# Patient Record
Sex: MALE | Race: WHITE | URBAN - METROPOLITAN AREA
[De-identification: names, ages, dates, MRNs, and addresses within clinical notes are randomized per-mention and may not be internally consistent; named-entity substitution may affect disease eponyms.]

---

## 2022-03-02 ENCOUNTER — OFFICE VISIT (OUTPATIENT)
Dept: ENT CLINIC | Age: 27
End: 2022-03-02
Payer: COMMERCIAL

## 2022-03-02 VITALS
WEIGHT: 206 LBS | OXYGEN SATURATION: 98 % | BODY MASS INDEX: 34.32 KG/M2 | HEIGHT: 65 IN | DIASTOLIC BLOOD PRESSURE: 82 MMHG | RESPIRATION RATE: 16 BRPM | SYSTOLIC BLOOD PRESSURE: 126 MMHG | HEART RATE: 83 BPM | TEMPERATURE: 97.5 F

## 2022-03-02 DIAGNOSIS — J34.89 NASAL DRYNESS: ICD-10-CM

## 2022-03-02 DIAGNOSIS — R06.5 MOUTH BREATHING: Primary | ICD-10-CM

## 2022-03-02 DIAGNOSIS — R06.83 SNORING: ICD-10-CM

## 2022-03-02 DIAGNOSIS — R09.82 PND (POST-NASAL DRIP): ICD-10-CM

## 2022-03-02 DIAGNOSIS — R09.81 NASAL CONGESTION: ICD-10-CM

## 2022-03-02 PROCEDURE — 99204 OFFICE O/P NEW MOD 45 MIN: CPT | Performed by: NURSE PRACTITIONER

## 2022-03-02 RX ORDER — AZELASTINE 1 MG/ML
1 SPRAY, METERED NASAL 2 TIMES DAILY
Qty: 30 EACH | Refills: 3 | Status: SHIPPED | OUTPATIENT
Start: 2022-03-02 | End: 2022-07-25 | Stop reason: ALTCHOICE

## 2022-03-02 RX ORDER — MONTELUKAST SODIUM 10 MG/1
10 TABLET ORAL DAILY
Qty: 30 TABLET | Refills: 3 | Status: SHIPPED | OUTPATIENT
Start: 2022-03-02 | End: 2022-07-25 | Stop reason: ALTCHOICE

## 2022-03-02 NOTE — PROGRESS NOTES
Visit Vitals  /82 (BP 1 Location: Left upper arm, BP Patient Position: Sitting, BP Cuff Size: Large adult)   Pulse 83   Temp 97.5 °F (36.4 °C) (Temporal)   Resp 16   Ht 5' 5\" (1.651 m)   Wt 206 lb (93.4 kg)   SpO2 98%   BMI 34.28 kg/m²     Chief Complaint   Patient presents with    New Patient     sinus

## 2022-03-02 NOTE — PROGRESS NOTES
Otolaryngology-Head and Neck Surgery  New Patient Visit     Patient: Yasmin Agrawal  YOB: 1995  MRN: [id number]  Date of Service: 3/2/2022    Chief Complaint: nasal congestion    History of Present Illness: Yasmin Agrawal is a 23y.o. year old male who presents today for discussion of      Reports congestion for past 2 years  +PND, intermittent loss of smell, sinus pressure, snoring, mouth breathing with periods of apnea  Denies otalgia, otorrhea, rhinorrhea  Alternates sides  Worse at night, awakens unable to breath several times at night  Also states a correlation with riding with employee who smokes. Congestion increases around smoke. He works for a [employer]. No alleviating factors    Tried Flonase for 1 week caused sore throat and difficulty swallowing  antihistamines prn    Never had allergy testing  No ENT surgical HX  No pertinent family HX    Past Medical History:  History reviewed. No pertinent past medical history. Past Surgical History:   History reviewed. No pertinent surgical history. Medications:   No current outpatient medications    Allergies:   No Known Allergies    Social History:   Social History     Tobacco Use    Smoking status: Never Smoker    Smokeless tobacco: Never Used   Substance Use Topics    Alcohol use: Not on file    Drug use: Not on file        Family History:  History reviewed. No pertinent family history. Review of Systems:    Consitutional: denies fever, excessive weight gain or loss. Eyes: denies diplopia, eye pain. Integumentary: denies new concerning skin lesions. Ears, Nose, Mouth, Throat: denies except as per HPI.   Endocrine: denies hot or cold intolerance, increased thirst.  Respiratory: denies cough, hemoptysis, wheezing  Gastrointestinal: denies trouble swallowing, nausea, emesis, regurgitation  Musculoskeletal: denies muscle weakness or wasting  Cardiovascular: denies chest pain, shortness of breath  Neurologic: denies seizures, numbness or tingling, syncope  Hematologic: denies easy bleeding or bruising    Physical Examination:   Vitals:    03/02/22 0859   BP: 126/82   BP 1 Location: Left upper arm   BP Patient Position: Sitting   BP Cuff Size: Large adult   Pulse: 83   Temp: 97.5 °F (36.4 °C)   TempSrc: Temporal   Resp: 16   Height: 5' 5\" (1.651 m)   Weight: 206 lb (93.4 kg)   SpO2: 98%        General: Comfortable, pleasant, appears stated age  Voice: Strong, speaking in full sentences, no stridor    Face: No masses or lesions, facial strength symmetric   Ears: External ears unremarkable. Bilateral ear canal clear. Tympanic membrane clear and intact, with visible landmarks. Clear middle ear space  Nose: External nose unremarkable. Dorsum midline. Anterior rhinoscopy demonstrates dry nasal passage, no lesions. Septum midline. Turbinates edematous. Oral Cavity / Oropharynx: No trismus. Mucosa pink and moist. No lesions. Tongue is midline and mobile. Palate elevates symmetrically. Uvula midline. Tonsils 1-2+ with notable signs of sinus drainage. Base of tongue soft. Floor of mouth soft. Neck: Supple. No adenopathy. Thyroid unremarkable. Palpable laryngeal landmarks. Full neck range of motion   Neurologic: CN II - XI intact. Normal gait      Assessment and Plan:   1. Nasal congestion    2. PND   3. Snoring  4. Mouth breathing  5. Nasal dryness    -Will trial Astelin and Singulair. Suspect some allergic components.  -Patient reports symptoms prominent at night and has recently been told has periods of apnea. Tonsils were mildly enlarged on exam. Will refer to sleep medicine.   -Nasal saline rinses  -Humidifier, steam  -Return to office in 6 weeks.        Windy Cedillo MSN, FNP-C  [facility]  [address] [place] [address] 6  [facility]  Office Phone: [phone]

## 2022-04-05 ENCOUNTER — OFFICE VISIT (OUTPATIENT)
Dept: SLEEP MEDICINE | Age: 27
End: 2022-04-05
Payer: COMMERCIAL

## 2022-04-05 VITALS
DIASTOLIC BLOOD PRESSURE: 70 MMHG | HEART RATE: 76 BPM | WEIGHT: 210 LBS | HEIGHT: 65 IN | SYSTOLIC BLOOD PRESSURE: 111 MMHG | OXYGEN SATURATION: 99 % | BODY MASS INDEX: 34.99 KG/M2

## 2022-04-05 DIAGNOSIS — G47.33 OSA (OBSTRUCTIVE SLEEP APNEA): Primary | ICD-10-CM

## 2022-04-05 PROCEDURE — 99204 OFFICE O/P NEW MOD 45 MIN: CPT | Performed by: SPECIALIST

## 2022-04-05 NOTE — PATIENT INSTRUCTIONS
Learning About Sleep Apnea  What is it? Sleep apnea means that breathing stops for short periods during sleep. When you stop breathing or have reduced airflow into your lungs during sleep, you don't sleep well and you can be very tired during the day. The oxygen levels in your blood may go down, and carbon dioxide levels go up. It may lead to other problems, such as high blood pressure and heart disease. Sleep apnea can range from mild to severe, based on how often breathing stops during sleep. For adults, breathing may stop as few as 5 times an hour (mild apnea) to 30 or more times an hour (severe apnea). Obstructive sleep apnea is the most common type. This most often occurs because your airways are blocked or partly blocked. Central sleep apnea is less common. It happens when the brain has trouble controlling breathing. Some people have both types. That's called complex sleep apnea. What are the symptoms? There are symptoms of sleep apnea that you may notice and symptoms that others may notice when you're asleep. Symptoms you may notice include:  · Feeling extremely sleepy during the day. · Feeling unrefreshed or tired after a night's sleep. · Problems with memory and concentration, or mood changes. · Morning headaches. · Getting up often during the night to urinate. · A dry mouth or sore throat in the morning. If you have a bed partner, they may notice that you:  · Have episodes of not breathing. · Snore loudly. Almost all people who have sleep apnea snore. But not all people who snore have sleep apnea. · Toss and turn during sleep. · Have nighttime choking or gasping spells. How is it diagnosed? Your doctor will probably do a physical exam and ask about your past health. The doctor may also ask you or your bed partner about your snoring and sleep behavior and how tired you feel during the day. Your doctor may suggest a sleep study.  Sleep studies are a series of tests that look at what happens to the body during sleep. They check for how often you stop breathing or have too little air flowing into your lungs during sleep. They also find out how much oxygen you have in your blood during sleep. A sleep study may take place in your home. Or it might take place at a sleep center, where you will spend the night. If your sleep apnea doesn't improve with treatment, you may have more tests to find out what's causing it. How is it treated? You may be able to help treat sleep apnea by making some lifestyle changes. You could try to lose weight, sleep on your side, and avoid alcohol and medicines like sedatives before bed. Sleep apnea is often treated with machines that deliver air through a mask to help keep your airways open. These include:  · Continuous positive airway pressure (CPAP). This increases air pressure in your throat. It keeps your airway open when you breathe in. It's the most common device. · Bilevel positive airway pressure (BPAP). You may also hear this called BiPAP. This uses different air pressures when you breathe in and out. · Adaptive servo ventilation (ASV). It senses pauses in breathing and adjusts air pressure. It's mostly used for central sleep apnea. You can also try oral breathing devices or nasal devices. If your tonsils or other tissues are blocking your airway, your doctor may suggest surgery to open the airway. How can you care for yourself at home? · Lose weight, if needed. · Sleep on your side. It may help mild apnea. · Avoid alcohol and medicines such as sleeping pills, opioids, or sedatives before bed. · Don't smoke. If you need help quitting, talk to your doctor. · Prop up the head of your bed. · Treat breathing problems, such as a stuffy nose, that are caused by a cold or allergies. · Try a continuous positive airway pressure (CPAP) breathing machine if your doctor recommends it.   · If CPAP doesn't work for you, ask your doctor if you can try other masks, settings, or breathing machines. · Try oral breathing devices or other nasal devices. · Talk to your doctor if your nose feels dry or bleeds, or if it gets runny or stuffy when you use a breathing machine. · Tell your doctor if you're sleepy during the day and it affects your daily life. Don't drive or operate machinery when you're drowsy. Where can you learn more? Go to http://benigno-deshawn.info/  Enter S121 in the search box to learn more about \"Learning About Sleep Apnea. \"  Current as of: July 6, 2021               Content Version: 13.2  © 0823-6880 [employer]. Care instructions adapted under license by [employer] (which disclaims liability or warranty for this information). If you have questions about a medical condition or this instruction, always ask your healthcare professional. [place] [address] any warranty or liability for your use of this information.

## 2022-04-05 NOTE — PROGRESS NOTES
[address] [facility] [place]. [place], [address]  Tel.  [phone]  Fax. [address] [facility] 60  [place], [address] [facility]  Tel.  [phone]  Fax. [phone] [address], [address]  Tel.  [phone]  Fax. [phone]       Chief Complaint       Chief Complaint   Patient presents with    Sleep Problem     F2F NP refd by DO Therese Louise for snoring       HPI      Emma Pena is 23 y.o. male seen for evaluation of a sleep disorder. Has been told of snoring associate with apparent apnea. Normally retires at [employer] and will get a bed at 7 AM.  May awaken once during the night. Describes himself as \"groggy\" for 30 minutes after awakening. Otherwise denies excessive daytime sleepiness. Notes history of nasal congestion. Denies vivid dreaming nightmares, sleep talking or sleepwalking, bruxism or nocturnal incontinence, abnormal arm or leg movements, hypnagogue hallucinations) or cataplexy. The patient has not undergone diagnostic testing for the current problems. No Known Allergies    Current Outpatient Medications   Medication Sig Dispense Refill    azelastine (ASTELIN) 137 mcg (0.1 %) nasal spray 1 [place] by Both Nostrils route two (2) times a day. Start once daily before bed, increase to BID if helpful 30 Each 3    montelukast (SINGULAIR) 10 mg tablet Take 1 Tablet by mouth daily. 30 Tablet 3        He  has no past medical history on file. He  has no past surgical history on file. He family history is not on file. He  reports that he has never smoked. He has never used smokeless tobacco.     Review of Systems:  ROS      Objective:     Visit Vitals  /70 (BP 1 Location: Left arm, BP Patient Position: Sitting, BP Cuff Size: Adult)   Pulse 76   Ht 5' 5\" (1.651 m)   Wt 210 lb (95.3 kg)   SpO2 99%   BMI 34.95 kg/m²     Body mass index is 34.95 kg/m².     General:   Conversant, cooperative   Eyes:  Pupils equal and reactive, no nystagmus   Oropharynx:   Mallampati score I, tongue mildly scalloped       Neck:   No carotid bruits; Chest/Lungs:  Clear on auscultation    CVS:  Normal rate, regular rhythm   Skin:  Warm to touch; no obvious rashes   Neuro:  Speech fluent, face symmetrical, tongue movement normal   Psych:  Normal affect,  normal countenance        Assessment:       ICD-10-CM ICD-9-CM    1. KATHERYN (obstructive sleep apnea)  G47.33 327.23 SLEEP STUDY UNATTENDED, 4 CHANNEL     Potential sleep disordered breathing. Will be evaluated with a home sleep test oral/nasal cannula will be used. Plan:     Orders Placed This Encounter    SLEEP STUDY UNATTENDED, 4 CHANNEL     Use oral-nasal cannula     Order Specific Question:   Reason for Exam     Answer:   snoring       * Patient has a history and examination consistent with the diagnosis of sleep apnea. *Home sleep testing was ordered for initial evaluation. * He was provided information on sleep apnea including corresponding risk factors and the importance of proper treatment. * Treatment options if indicated were reviewed today. Instructions:  o Do not engage in activities requiring a normal degree of alertness if fatigue is present. o The patient understands that untreated or undertreated sleep apnea could impair judgement and the ability to function normally during the day.  o Call or return if symptoms worsen or persist.          Kwabena Nieves MD, FAA  Electronically signed 04/05/22       This note was created using voice recognition software. Despite editing, there may be syntax errors. This note will not be viewable in [address]

## 2022-04-14 ENCOUNTER — OFFICE VISIT (OUTPATIENT)
Dept: ENT CLINIC | Age: 27
End: 2022-04-14
Payer: COMMERCIAL

## 2022-04-14 VITALS
DIASTOLIC BLOOD PRESSURE: 78 MMHG | BODY MASS INDEX: 34.99 KG/M2 | HEART RATE: 88 BPM | RESPIRATION RATE: 18 BRPM | OXYGEN SATURATION: 98 % | SYSTOLIC BLOOD PRESSURE: 120 MMHG | WEIGHT: 210 LBS | HEIGHT: 65 IN

## 2022-04-14 DIAGNOSIS — R06.83 SNORING: ICD-10-CM

## 2022-04-14 DIAGNOSIS — R09.81 NASAL CONGESTION: Primary | ICD-10-CM

## 2022-04-14 DIAGNOSIS — R09.82 PND (POST-NASAL DRIP): ICD-10-CM

## 2022-04-14 PROCEDURE — 99213 OFFICE O/P EST LOW 20 MIN: CPT | Performed by: NURSE PRACTITIONER

## 2022-04-14 NOTE — PROGRESS NOTES
Visit Vitals  Blood Pressure 120/78 (BP 1 Location: Left upper arm, BP Patient Position: Sitting, BP Cuff Size: Adult)   Pulse 88   Respiration 18   Height 5' 5\" (1.651 m)   Weight 210 lb (95.3 kg)   Oxygen Saturation 98%   Body Mass Index 34.95 kg/m²     Chief Complaint   Patient presents with    Follow-up     Nasal congestion

## 2022-04-14 NOTE — LETTER
4/14/2022 9:20 AM    Mr. Bobby Membreno  [place] [address] [facility]    To whom it may concern: To help with Mr. 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 Please contact with any questions.         Sincerely,      Jewel Closs, NP

## 2022-04-14 NOTE — PROGRESS NOTES
Otolaryngology-Head and Neck Surgery  Follow Up Patient Visit     Patient: Trina Oneill  YOB: 1995  MRN: [id number]  Date of Service:  4/14/2022    Chief Complaint: nasal congestion    History of Present Illness: Trina Oneill is a 23y.o. year old male who was last seen 3/2/22 for nasal congestion. he presents today for follow up. 3/2/22: Reports congestion for past 2 years  +PND, intermittent loss of smell, sinus pressure, snoring, mouth breathing with periods of apnea  Denies otalgia, otorrhea, rhinorrhea  Alternates sides  Worse at night, awakens unable to breath several times at night  Also states a correlation with riding with employee who smokes. Congestion increases around smoke. He works for a [employer]. No alleviating factors  Tried Flonase for 1 week caused sore throat and difficulty swallowing  antihistamines prn    Today reports congestion has improved 50%  Ran out of Singulair 1 1/2 weeks ago  Using Astelin as needed and saline daily  Recently started antihistamine approx. 1 week ago  Having sleep study in May  States having less periods of apnea and sleeping much better since starting meds    Past Medical History:  History reviewed. No pertinent past medical history. Past Surgical History:   History reviewed. No pertinent surgical history. Medications:   Current Outpatient Medications   Medication Instructions    azelastine (ASTELIN) 137 mcg (0.1 %) nasal spray 1 Spray, Both Nostrils, 2 TIMES DAILY, Start once daily before bed, increase to BID if helpful    montelukast (SINGULAIR) 10 mg, Oral, DAILY       Allergies:   No Known Allergies    Social History:   Social History     Tobacco Use    Smoking status: Never Smoker    Smokeless tobacco: Never Used   Substance Use Topics    Alcohol use: Not on file    Drug use: Not on file       Family History:  History reviewed. No pertinent family history.     Review of Systems:  Consitutional: denies fever, excessive weight gain or loss. Eyes: denies diplopia, eye pain. Integumentary: denies new concerning skin lesions. Ears, Nose, Mouth, Throat: denies except as per HPI. Endocrine: denies hot or cold intolerance, increased thirst.  Respiratory: denies cough, hemoptysis, wheezing  Gastrointestinal: denies trouble swallowing, nausea, emesis, regurgitation  Musculoskeletal: denies muscle weakness or wasting  Cardiovascular: denies chest pain, shortness of breath  Neurologic: denies seizures, numbness or tingling, syncope  Hematologic: denies easy bleeding or bruising    Physical Examination:   Vitals:    04/14/22 0905   BP: 120/78   BP 1 Location: Left upper arm   BP Patient Position: Sitting   BP Cuff Size: Adult   Pulse: 88   Resp: 18   Height: 5' 5\" (1.651 m)   Weight: 210 lb (95.3 kg)   SpO2: 98%         General: Comfortable, pleasant, appears stated age  Voice: Strong, speaking in full sentences, no stridor    Face: No masses or lesions, facial strength symmetric   Ears: External ears unremarkable. Bilateral ear canal clear. Tympanic membrane clear and intact, with visible landmarks. Clear middle ear space  Nose: External nose unremarkable. Dorsum midline. Anterior rhinoscopy demonstrates no lesions. Septum midline. Turbinates with hypertrophy. Oral Cavity / Oropharynx: No trismus. Mucosa pink and moist. No lesions. Tongue is midline and mobile. Palate elevates symmetrically. Uvula midline. Tonsils unremarkable. Base of tongue soft. Floor of mouth soft. Neck: Supple. No adenopathy. Thyroid unremarkable. Palpable laryngeal landmarks. Full neck range of motion   Neurologic: CN II - XI intact. Normal gait      Assessment and Plan:   1. Nasal congestion    2. PND   3. Snoring    -Continue current medication regimen. Consistent use of Astelin.  Patient has 3 refills he was not sure how to get; instructed on how to  from pharmacy.  -Continue antihistamine.  -Patient reported doing home sleep study in May.  -Has not gotten humidifier but will soon.  -Work note given. -Will hold on allergy testing/imaging for now as patient is improving. Reinforced consistent use of medications.   -Return to office in 4 months.        Jewel Closs MSN, FNP-C  [facility]  [address] [place] [address]  Office Phone: [phone]

## 2022-05-02 ENCOUNTER — OFFICE VISIT (OUTPATIENT)
Dept: SLEEP MEDICINE | Age: 27
End: 2022-05-02

## 2022-05-02 ENCOUNTER — HOSPITAL ENCOUNTER (OUTPATIENT)
Dept: SLEEP MEDICINE | Age: 27
Discharge: HOME | End: 2022-05-02
Payer: COMMERCIAL

## 2022-05-02 PROCEDURE — 95806 SLEEP STUDY UNATT&RESP EFFT: CPT | Performed by: SPECIALIST

## 2022-05-02 NOTE — PROGRESS NOTES
[address] [place]. [place], [address]  Tel.  [phone]  Fax. 100 [facility] 60  [place], [address] [facility]  Tel.  [phone]  Fax. [phone] [address] [facility]  Tel.  [phone]  Fax. [phone]       S>Neri Rivas is a 23 y.o. male seen today to receive a home sleep testing unit (HST). · Patient was educated on proper hookup and operation of the HST. · Instruction forms and documentation were reviewed and signed. · The patient demonstrated good understanding of the HST.    O>    There were no vitals taken for this visit. A>  No diagnosis found. P>  · General information regarding operations and maintenance of the device was provided. · He was provided information on sleep apnea including coresponding risk factors and the importance of proper treatment. · Follow-up appointment was made to return the HST. He will be contacted once the results have been reviewed. · He was asked to contact our office for any problems regarding his home sleep test study.   · [facility] [id number]

## 2022-05-09 ENCOUNTER — TELEPHONE (OUTPATIENT)
Dept: SLEEP MEDICINE | Age: 27
End: 2022-05-09

## 2022-05-11 ENCOUNTER — TELEPHONE (OUTPATIENT)
Dept: ENT CLINIC | Age: 27
End: 2022-05-11

## 2022-05-13 NOTE — TELEPHONE ENCOUNTER
HSAT demonstrated normal AHI 1.8/h associated with minimal SaO2 87%. Snoring during 0.8%. Recommendation: Repeat study if symptoms become more pronounced. Sleep technologist: Please advise patient of HSAT results.

## 2022-05-16 NOTE — TELEPHONE ENCOUNTER
Reviewed sleep study results with patient.  He expressed understanding and will follow up with his ENT

## 2022-06-02 ENCOUNTER — TELEPHONE (OUTPATIENT)
Dept: ENT CLINIC | Age: 27
End: 2022-06-02

## 2022-06-08 ENCOUNTER — OFFICE VISIT (OUTPATIENT)
Dept: ENT CLINIC | Age: 27
End: 2022-06-08

## 2022-06-08 VITALS
TEMPERATURE: 98.1 F | SYSTOLIC BLOOD PRESSURE: 120 MMHG | DIASTOLIC BLOOD PRESSURE: 68 MMHG | HEIGHT: 65 IN | RESPIRATION RATE: 18 BRPM | BODY MASS INDEX: 34.92 KG/M2 | HEART RATE: 77 BPM | OXYGEN SATURATION: 99 % | WEIGHT: 209.6 LBS

## 2022-06-08 DIAGNOSIS — R04.0 EPISTAXIS: ICD-10-CM

## 2022-06-08 DIAGNOSIS — R09.81 NASAL CONGESTION: Primary | ICD-10-CM

## 2022-06-08 NOTE — PROGRESS NOTES
Otolaryngology-Head and Neck Surgery  New Patient Visit     Patient: Ellis Whatley  YOB: 1995  MRN: [id number]  Date of Service: 6/8/2022    Chief Complaint: Epistaxis     History of Present Illness: Ellis Whatley is a 23y.o. year old male who presents today for discussion of epistaxis    Has nasal congestion - using flonase and astelin which he found to be helpful  Flonase helpful, astelin unclear  Developed nosebleeds with the flonase however     Have stopped after stopping nasal sprays     Past Medical History:  History reviewed. No pertinent past medical history. Past Surgical History:   History reviewed. No pertinent surgical history. Medications:   Current Outpatient Medications   Medication Instructions    azelastine (ASTELIN) 137 mcg (0.1 %) nasal spray 1 Spray, Both Nostrils, 2 TIMES DAILY, Start once daily before bed, increase to BID if helpful    montelukast (SINGULAIR) 10 mg, Oral, DAILY       Allergies:   No Known Allergies    Social History:   Social History     Tobacco Use    Smoking status: Never Smoker    Smokeless tobacco: Never Used   Substance Use Topics    Alcohol use: Not on file    Drug use: Not on file        Family History:  History reviewed. No pertinent family history. Review of Systems:    Consitutional: denies fever, excessive weight gain or loss. Eyes: denies diplopia, eye pain. Integumentary: denies new concerning skin lesions. Ears, Nose, Mouth, Throat: denies except as per HPI.   Endocrine: denies hot or cold intolerance, increased thirst.  Respiratory: denies cough, hemoptysis, wheezing  Gastrointestinal: denies trouble swallowing, nausea, emesis, regurgitation  Musculoskeletal: denies muscle weakness or wasting  Cardiovascular: denies chest pain, shortness of breath  Neurologic: denies seizures, numbness or tingling, syncope  Hematologic: denies easy bleeding or bruising    Physical Examination:   Vitals:    06/08/22 1607   BP: 120/68   BP 1 Location: Right upper arm   BP Patient Position: Sitting   BP Cuff Size: Adult   Pulse: 77   Temp: 98.1 °F (36.7 °C)   TempSrc: Temporal   Resp: 18   Height: 5' 5\" (1.651 m)   Weight: 209 lb 9.6 oz (95.1 kg)   SpO2: 99%        General: Comfortable, pleasant, appears stated age  Voice: Strong, speaking in full sentences, no stridor    Face: No masses or lesions, facial strength symmetric   Ears: External ears unremarkable. Bilateral ear canal clear. Tympanic membrane clear and intact, with visible landmarks. Clear middle ear space  Nose: External nose unremarkable. Dorsum midline. Anterior rhinoscopy demonstrates prominent blood vessels along the anterior septum bilaterally, L > R   Oral Cavity / Oropharynx: No trismus. Mucosa pink and moist. No lesions. Tongue is midline and mobile. Palate elevates symmetrically. Uvula midline. Tonsils unremarkable. Base of tongue soft. Floor of mouth soft. Neck: Supple. No adenopathy. Thyroid unremarkable. Palpable laryngeal landmarks. Full neck range of motion   Neurologic: CN II - XI intact. Normal gait    PROCEDURE: NASAL CAUTERY    Preoperative diagnosis: Epistaxis  Postoperative diagnosis: Epistaxis    Informed consent was obtained. Bilateral nares was anesthetized with an oxymetazoline and 4% lidocaine soaked cottonoid. This was removed and anterior rhinoscopy demonstrated the likely bleeding source. Silver nitrate cautery was used to cauterize this area with satisfactory result. Bacitracin ointment was then applied. The patient tolerated the procedure well. Assessment and Plan:   1. Epistaxis  2. Nasal congestion  3.  Allergic rhinitis  - Nose cauterized today  - Use nasal saline and moisture   - Discussed epistaxis precautions  - Hopefully as nose heals, trial flonase sensimist instead  - RTC in 1 month   - If not able to tolerate nasal sprays, then can consider allergy testing, nasal scope        The patient was instructed to return to clinic if no improvement or progression of symptoms. Signs to watch out for reviewed.       MD Rosy CabreraECU [facility]  [address] [place] [address] [address]  [facility]  Office Phone: [phone]

## 2022-06-08 NOTE — PROGRESS NOTES
1. \"Have you been to the ER, urgent care clinic since your last visit? Hospitalized since your last visit? 2. \"Have you seen or consulted any other health care providers outside of the [address] [place] [address] since your last visit? \"        May Servin is a 23 y.o. male is coming in for with the following:     Chief Complaint   Patient presents with    Epistaxis          With the following vitals: There were no vitals taken for this visit. There were no vitals taken for this visit.   Visit Vitals  /68 (BP 1 Location: Right upper arm, BP Patient Position: Sitting, BP Cuff Size: Adult)   Pulse 77   Temp 98.1 °F (36.7 °C) (Temporal)   Resp 18   Ht 5' 5\" (1.651 m)   Wt 209 lb 9.6 oz (95.1 kg)   SpO2 99%   BMI 34.88 kg/m²

## 2022-06-13 ENCOUNTER — TELEPHONE (OUTPATIENT)
Dept: ENT CLINIC | Age: 27
End: 2022-06-13

## 2022-06-13 NOTE — TELEPHONE ENCOUNTER
Pt called stating that his nose was cauterized 06/08/22 and since then his nose has been filled with blood clots and bleeding on and off.       Please advise

## 2022-06-14 ENCOUNTER — OFFICE VISIT (OUTPATIENT)
Dept: ENT CLINIC | Age: 27
End: 2022-06-14
Payer: COMMERCIAL

## 2022-06-14 VITALS
OXYGEN SATURATION: 99 % | WEIGHT: 209 LBS | RESPIRATION RATE: 17 BRPM | HEART RATE: 78 BPM | SYSTOLIC BLOOD PRESSURE: 118 MMHG | BODY MASS INDEX: 34.82 KG/M2 | DIASTOLIC BLOOD PRESSURE: 78 MMHG | HEIGHT: 65 IN

## 2022-06-14 DIAGNOSIS — J34.89 NASAL DRYNESS: ICD-10-CM

## 2022-06-14 DIAGNOSIS — J31.0 CHRONIC RHINITIS: ICD-10-CM

## 2022-06-14 DIAGNOSIS — R04.0 EPISTAXIS: Primary | ICD-10-CM

## 2022-06-14 PROCEDURE — 99214 OFFICE O/P EST MOD 30 MIN: CPT | Performed by: OTOLARYNGOLOGY

## 2022-06-14 PROCEDURE — 30901 CONTROL OF NOSEBLEED: CPT | Performed by: OTOLARYNGOLOGY

## 2022-06-14 NOTE — PROGRESS NOTES
Otolaryngology-Head and Neck Surgery    Patient: Ramses Weinberg  YOB: 1995  MRN: [id number]  Date of Service: 6/14/2022    Chief Complaint: Epistaxis     History of Present Illness: Ramses Weinberg is a 23y.o. year old male who presents today for discussion of epistaxis    Has nasal congestion - using flonase and astelin which he found to be helpful  Flonase helpful, astelin unclear  Developed nosebleeds with the flonase however     Have stopped after stopping nasal sprays    6/14/22 - 1 week f/u pt states still having left sided nosebleeds since cauterization last week. Has been using saline bid. At one point bleeding lasted 45 minutes. Past Medical History:  History reviewed. No pertinent past medical history. Past Surgical History:   History reviewed. No pertinent surgical history. Medications:   Current Outpatient Medications   Medication Instructions    azelastine (ASTELIN) 137 mcg (0.1 %) nasal spray 1 Spray, Both Nostrils, 2 TIMES DAILY, Start once daily before bed, increase to BID if helpful    montelukast (SINGULAIR) 10 mg, Oral, DAILY       Allergies:   No Known Allergies    Social History:   Social History     Tobacco Use    Smoking status: Never Smoker    Smokeless tobacco: Never Used   Substance Use Topics    Alcohol use: Not on file    Drug use: Not on file        Family History:  History reviewed. No pertinent family history. Review of Systems:    Consitutional: denies fever, excessive weight gain or loss. Eyes: denies diplopia, eye pain. Integumentary: denies new concerning skin lesions. Ears, Nose, Mouth, Throat: denies except as per HPI.   Endocrine: denies hot or cold intolerance, increased thirst.  Respiratory: denies cough, hemoptysis, wheezing  Gastrointestinal: denies trouble swallowing, nausea, emesis, regurgitation  Musculoskeletal: denies muscle weakness or wasting  Cardiovascular: denies chest pain, shortness of breath  Neurologic: denies seizures, numbness or tingling, syncope  Hematologic: denies easy bleeding or bruising    Physical Examination:   Vitals:    06/14/22 0900   BP: 118/78   BP 1 Location: Left upper arm   BP Patient Position: Sitting   BP Cuff Size: Adult   Pulse: 78   Resp: 17   Height: 5' 5\" (1.651 m)   Weight: 209 lb (94.8 kg)   SpO2: 99%        General: Comfortable, pleasant, appears stated age  Voice: Strong, speaking in full sentences, no stridor    Face: No masses or lesions, facial strength symmetric   Ears: External ears unremarkable. Bilateral ear canal clear. Tympanic membrane clear and intact, with visible landmarks. Clear middle ear space  Nose: External nose unremarkable. Dorsum midline. Anterior rhinoscopy demonstrates nasal crusting and dryness anterior septum bilateral.  No active bleeding. Oral Cavity / Oropharynx: No trismus. Mucosa pink and moist. No lesions. Tongue is midline and mobile. Palate elevates symmetrically. Uvula midline. Tonsils unremarkable. Base of tongue soft. Floor of mouth soft. Neck: Supple. No adenopathy. Thyroid unremarkable. Palpable laryngeal landmarks. Full neck range of motion   Neurologic: CN II - XI intact. Normal gait      Procedure: Nasal Cautery for Epistaxis    Consent is obtained. The left nare is packed with a oxymetazoline/lidocaine cottonoid for several minutes for topical anesthesia. This was then removed and I irritated the left anterior nasal septum with a dry cotton-tipped applicator. The telangiectasia or bleeding area was cauterized with silver nitrate with good effect. Procedure tolerated well. Assessment and Plan:   1. Epistaxis  2. Chronic rhinitis    Repeat cautery of the left side today. He will continue with saline and I recommend he use Ayr gel as well also instructed him on proper technique to stop nosebleeds at home using cotton ball, oxymetazoline and pressure. He has an appointment in around 1 month follow-up with Dr. Cynthia Reyna.     Call if needs appointment sooner.

## 2022-07-25 ENCOUNTER — OFFICE VISIT (OUTPATIENT)
Dept: ENT CLINIC | Age: 27
End: 2022-07-25
Payer: COMMERCIAL

## 2022-07-25 VITALS
DIASTOLIC BLOOD PRESSURE: 64 MMHG | HEART RATE: 68 BPM | BODY MASS INDEX: 34.32 KG/M2 | HEIGHT: 65 IN | TEMPERATURE: 98.9 F | WEIGHT: 206 LBS | RESPIRATION RATE: 16 BRPM | SYSTOLIC BLOOD PRESSURE: 118 MMHG

## 2022-07-25 DIAGNOSIS — J34.89 NASAL DRYNESS: ICD-10-CM

## 2022-07-25 DIAGNOSIS — R04.0 EPISTAXIS: Primary | ICD-10-CM

## 2022-07-25 DIAGNOSIS — R09.81 NASAL CONGESTION: ICD-10-CM

## 2022-07-25 PROCEDURE — 99213 OFFICE O/P EST LOW 20 MIN: CPT | Performed by: OTOLARYNGOLOGY

## 2022-07-25 NOTE — PROGRESS NOTES
Visit Vitals  /64 (BP 1 Location: Left upper arm, BP Patient Position: Sitting, BP Cuff Size: Large adult)   Pulse 68   Temp 98.9 °F (37.2 °C) (Temporal)   Resp 16   Ht 5' 5\" (1.651 m)   Wt 206 lb (93.4 kg)   BMI 34.28 kg/m²     Chief Complaint   Patient presents with    Follow-up     Nose bleeds

## 2022-07-25 NOTE — PROGRESS NOTES
Otolaryngology-Head and Neck Surgery  Follow Up  Patient Visit     Patient: Corey Cortez  YOB: 1995  MRN: [id number]  Date of Service: 7/25/2022    Chief Complaint: Epistaxis     Interval hx: 7/25/2022   Seen by Dr Chloe Alonzo on 6/14, had repeat cautery, left    No further bleeding  Using flonase sensimist - some relief but not as good as the traditional flonase       History of Present Illness: Corey Cortez is a 23y.o. year old male who presents today for discussion of epistaxis    Has nasal congestion - using flonase and astelin which he found to be helpful  Flonase helpful, astelin unclear  Developed nosebleeds with the flonase however     Have stopped after stopping nasal sprays     Past Medical History:  History reviewed. No pertinent past medical history. Past Surgical History:   History reviewed. No pertinent surgical history. Medications:   Current Outpatient Medications   Medication Instructions    azelastine (ASTELIN) 137 mcg (0.1 %) nasal spray 1 Spray, Both Nostrils, 2 TIMES DAILY, Start once daily before bed, increase to BID if helpful    montelukast (SINGULAIR) 10 mg, Oral, DAILY       Allergies:   No Known Allergies    Social History:   Social History     Tobacco Use    Smoking status: Never    Smokeless tobacco: Never        Family History:  History reviewed. No pertinent family history. Review of Systems:    Consitutional: denies fever, excessive weight gain or loss. Eyes: denies diplopia, eye pain. Integumentary: denies new concerning skin lesions. Ears, Nose, Mouth, Throat: denies except as per HPI.   Endocrine: denies hot or cold intolerance, increased thirst.  Respiratory: denies cough, hemoptysis, wheezing  Gastrointestinal: denies trouble swallowing, nausea, emesis, regurgitation  Musculoskeletal: denies muscle weakness or wasting  Cardiovascular: denies chest pain, shortness of breath  Neurologic: denies seizures, numbness or tingling, syncope  Hematologic: denies easy bleeding or bruising    Physical Examination:   Vitals:    07/25/22 0903   BP: 118/64   BP 1 Location: Left upper arm   BP Patient Position: Sitting   BP Cuff Size: Large adult   Pulse: 68   Temp: 98.9 °F (37.2 °C)   TempSrc: Temporal   Resp: 16   Height: 5' 5\" (1.651 m)   Weight: 206 lb (93.4 kg)        General: Comfortable, pleasant, appears stated age  Voice: Strong, speaking in full sentences, no stridor    Face: No masses or lesions, facial strength symmetric   Ears: External ears unremarkable. Bilateral ear canal clear. Tympanic membrane clear and intact, with visible landmarks. Clear middle ear space  Nose: External nose unremarkable. Dorsum midline. Anterior rhinoscopy demonstrates improved nasal dryness. Small vessel R anterior septum, L septum clear   Oral Cavity / Oropharynx: No trismus. Mucosa pink and moist. No lesions. Tongue is midline and mobile. Palate elevates symmetrically. Uvula midline. Tonsils unremarkable. Base of tongue soft. Floor of mouth soft. Neck: Supple. No adenopathy. Thyroid unremarkable. Palpable laryngeal landmarks. Full neck range of motion   Neurologic: CN II - XI intact. Normal gait      Assessment and Plan:   Epistaxis  Nasal congestion  Allergic rhinitis  - S/p L nasal cautery x 2   - Epistaxis better but now congestion  - Discussed option of 1) trial resumption of flonase traditional now that his now has been cauterized and is looking better 2) allergy testing  3) consider turb reduction in office   - He would like to start with trial flonase - can update us if nosebleeds recur       The patient was instructed to return to clinic if no improvement or progression of symptoms. Signs to watch out for reviewed.       MD Verito Orourke [facility]  [address] [place] [address] 6  [facility]  Office Phone: [phone]

## 2023-01-05 NOTE — TELEPHONE ENCOUNTER
Pt called inquiring about a sooner appt. He stated the last few days his nose has been bleeding on and off at least once a day. He stated it is not a lot of blood. It is not actively bleeding but it was bleeding this morning. Please advise on appt for pt.
Spoke with pt, per Dr. Marcie Steele stop using all nasal sprays and start nasal saline, AYR gel or vaseline. Avoid manipulation of nose. Continue humidifier. Appt with Dr. Marcie Steele.  Wendy
05-Jan-2023 13:21

## 2024-05-06 ENCOUNTER — HOSPITAL ENCOUNTER (EMERGENCY)
Facility: HOSPITAL | Age: 29
Discharge: HOME | End: 2024-05-06
Attending: STUDENT IN AN ORGANIZED HEALTH CARE EDUCATION/TRAINING PROGRAM
Payer: COMMERCIAL

## 2024-05-06 VITALS
HEIGHT: 65 IN | DIASTOLIC BLOOD PRESSURE: 76 MMHG | BODY MASS INDEX: 33.32 KG/M2 | WEIGHT: 200 LBS | TEMPERATURE: 98.6 F | SYSTOLIC BLOOD PRESSURE: 125 MMHG | RESPIRATION RATE: 18 BRPM | OXYGEN SATURATION: 97 % | HEART RATE: 77 BPM

## 2024-05-06 DIAGNOSIS — L50.9 URTICARIA: Primary | ICD-10-CM

## 2024-05-06 PROCEDURE — 99283 EMERGENCY DEPT VISIT LOW MDM: CPT

## 2024-05-06 RX ORDER — PREDNISONE 10 MG/1
TABLET ORAL
Qty: 21 TABLET | Refills: 0 | Status: SHIPPED | OUTPATIENT
Start: 2024-05-06 | End: 2024-05-15

## 2024-05-06 ASSESSMENT — LIFESTYLE VARIABLES
HOW MANY STANDARD DRINKS CONTAINING ALCOHOL DO YOU HAVE ON A TYPICAL DAY: PATIENT DOES NOT DRINK
HOW OFTEN DO YOU HAVE A DRINK CONTAINING ALCOHOL: NEVER

## 2024-05-06 ASSESSMENT — PAIN - FUNCTIONAL ASSESSMENT: PAIN_FUNCTIONAL_ASSESSMENT: 0-10

## 2024-05-06 ASSESSMENT — PAIN SCALES - GENERAL: PAINLEVEL_OUTOF10: 4

## 2024-05-07 ASSESSMENT — ENCOUNTER SYMPTOMS: SHORTNESS OF BREATH: 0

## 2024-05-07 NOTE — ED PROVIDER NOTES
[facility] EMERGENCY DEPT  EMERGENCY DEPARTMENT ENCOUNTER      Pt Name: Odilia Bowie  MRN: [id number]  Birthdate 1995  Date of evaluation: 5/6/2024  Provider: Trent Sanders MD    CHIEF COMPLAINT       Chief Complaint   Patient presents with    Generalized Body Aches    Urticaria         HISTORY OF PRESENT ILLNESS   28-year-old male with no significant past medical history presents to the ED with chief complaint of pruritic rash to his entire body starting 3 days ago.  Denies any difficulty swallowing, breathing, or voice changes.  No diarrhea.  He does not believe he has had any new exposures, denies new soaps, clothing, lotions, medications, or foods.  He does state that he just got over a viral illness with cough, congestion, rhinorrhea.  No history of allergic reactions in the past.  He says he took Allegra yesterday, no treatment today.    The history is provided by the patient.       Review of External Medical Records:     Nursing Notes were reviewed.    REVIEW OF SYSTEMS       Review of Systems   Respiratory:  Negative for shortness of breath.    Cardiovascular:  Negative for chest pain.       Except as noted above the remainder of the review of systems was reviewed and negative.       PAST MEDICAL HISTORY   History reviewed. No pertinent past medical history.      SURGICAL HISTORY     History reviewed. No pertinent surgical history.      CURRENT MEDICATIONS       Discharge Medication List as of 5/6/2024  9:42 PM          ALLERGIES     Patient has no known allergies.    FAMILY HISTORY     History reviewed. No pertinent family history.       SOCIAL HISTORY       Social History     Socioeconomic History    Marital status: Unknown     Spouse name: None    Number of children: None    Years of education: None    Highest education level: None   Tobacco Use    Smoking status: Never    Smokeless tobacco: Never       SCREENINGS         [place] Coma Scale  Eye Opening: Spontaneous  Best Verbal Response:

## 2024-05-07 NOTE — ED TRIAGE NOTES
Pt presents ambulatory into ed a&ox3, no acute distress, breaths even and unlabored c/o hives from head to toe since Saturday morning with generalized body aches and pains. Pt denies any known allergies. Denies any new soaps, detergents, lotions, medications. Denies any new foods. Denies difficulty breathing or swallowing. No angioedema noted.     Last took Allegra and \"generic allergy pill\" yesterday.